# Patient Record
Sex: FEMALE | Race: WHITE | NOT HISPANIC OR LATINO | Employment: OTHER | URBAN - METROPOLITAN AREA
[De-identification: names, ages, dates, MRNs, and addresses within clinical notes are randomized per-mention and may not be internally consistent; named-entity substitution may affect disease eponyms.]

---

## 2017-07-08 ENCOUNTER — HOSPITAL ENCOUNTER (EMERGENCY)
Facility: HOSPITAL | Age: 27
Discharge: HOME | End: 2017-07-08
Attending: EMERGENCY MEDICINE

## 2017-07-08 VITALS
HEART RATE: 71 BPM | DIASTOLIC BLOOD PRESSURE: 65 MMHG | BODY MASS INDEX: 19.33 KG/M2 | HEIGHT: 70 IN | RESPIRATION RATE: 15 BRPM | OXYGEN SATURATION: 100 % | TEMPERATURE: 98.8 F | WEIGHT: 135 LBS | SYSTOLIC BLOOD PRESSURE: 99 MMHG

## 2017-07-08 DIAGNOSIS — N76.0 BACTERIAL VAGINOSIS: Primary | ICD-10-CM

## 2017-07-08 DIAGNOSIS — B96.89 BACTERIAL VAGINOSIS: Primary | ICD-10-CM

## 2017-07-08 LAB
BACTERIA UR QL AUTO: ABNORMAL /HPF
BILIRUB UR QL STRIP: NEGATIVE
CLARITY UR: CLEAR
COLOR UR: YELLOW
GLUCOSE UR STRIP-MCNC: NEGATIVE MG/DL
HGB UR QL STRIP.AUTO: ABNORMAL
KETONES UR STRIP-MCNC: NEGATIVE MG/DL
LEUKOCYTE ESTERASE UR QL STRIP: ABNORMAL
NITRITE UR QL STRIP: NEGATIVE
NON-SQ EPI CELLS URNS QL MICRO: ABNORMAL /HPF
PH UR STRIP.AUTO: 6.5 [PH] (ref 4.5–8)
PROT UR STRIP-MCNC: NEGATIVE MG/DL
RBC #/AREA URNS AUTO: ABNORMAL /HPF
SP GR UR STRIP.AUTO: <=1.005 (ref 1–1.03)
UROBILINOGEN UR QL STRIP.AUTO: 0.2 E.U./DL
WBC #/AREA URNS AUTO: ABNORMAL /HPF

## 2017-07-08 PROCEDURE — 87591 N.GONORRHOEAE DNA AMP PROB: CPT | Performed by: PHYSICIAN ASSISTANT

## 2017-07-08 PROCEDURE — 87491 CHLMYD TRACH DNA AMP PROBE: CPT | Performed by: PHYSICIAN ASSISTANT

## 2017-07-08 PROCEDURE — 81001 URINALYSIS AUTO W/SCOPE: CPT | Performed by: PHYSICIAN ASSISTANT

## 2017-07-08 PROCEDURE — 99283 EMERGENCY DEPT VISIT LOW MDM: CPT

## 2017-07-08 RX ORDER — METRONIDAZOLE 500 MG/1
500 TABLET ORAL EVERY 12 HOURS SCHEDULED
Qty: 14 TABLET | Refills: 0 | Status: SHIPPED | OUTPATIENT
Start: 2017-07-08 | End: 2017-07-15

## 2017-07-10 LAB
CHLAMYDIA DNA CVX QL NAA+PROBE: NORMAL
N GONORRHOEA DNA GENITAL QL NAA+PROBE: NORMAL

## 2018-05-29 ENCOUNTER — APPOINTMENT (EMERGENCY)
Dept: CT IMAGING | Facility: HOSPITAL | Age: 28
End: 2018-05-29

## 2018-05-29 ENCOUNTER — APPOINTMENT (EMERGENCY)
Dept: RADIOLOGY | Facility: HOSPITAL | Age: 28
End: 2018-05-29

## 2018-05-29 ENCOUNTER — HOSPITAL ENCOUNTER (EMERGENCY)
Facility: HOSPITAL | Age: 28
Discharge: HOME | End: 2018-05-29
Attending: EMERGENCY MEDICINE | Admitting: EMERGENCY MEDICINE

## 2018-05-29 VITALS
HEART RATE: 91 BPM | SYSTOLIC BLOOD PRESSURE: 102 MMHG | OXYGEN SATURATION: 98 % | RESPIRATION RATE: 16 BRPM | TEMPERATURE: 100.4 F | BODY MASS INDEX: 19.36 KG/M2 | WEIGHT: 134.92 LBS | DIASTOLIC BLOOD PRESSURE: 58 MMHG

## 2018-05-29 DIAGNOSIS — J32.9 SINUSITIS: ICD-10-CM

## 2018-05-29 DIAGNOSIS — R05.9 COUGH: ICD-10-CM

## 2018-05-29 DIAGNOSIS — R20.2 PARESTHESIA OF HAND: ICD-10-CM

## 2018-05-29 DIAGNOSIS — D64.9 ANEMIA: ICD-10-CM

## 2018-05-29 DIAGNOSIS — R51.9 HEADACHE: Primary | ICD-10-CM

## 2018-05-29 LAB
ALBUMIN SERPL BCP-MCNC: 3.9 G/DL (ref 3.5–5)
ALP SERPL-CCNC: 36 U/L (ref 46–116)
ALT SERPL W P-5'-P-CCNC: 22 U/L (ref 12–78)
ANION GAP SERPL CALCULATED.3IONS-SCNC: 9 MMOL/L (ref 4–13)
AST SERPL W P-5'-P-CCNC: 14 U/L (ref 5–45)
BACTERIA UR QL AUTO: ABNORMAL /HPF
BASOPHILS # BLD AUTO: 0.03 THOUSANDS/ΜL (ref 0–0.1)
BASOPHILS NFR BLD AUTO: 0 % (ref 0–1)
BILIRUB SERPL-MCNC: 0.4 MG/DL (ref 0.2–1)
BILIRUB UR QL STRIP: NEGATIVE
BUN SERPL-MCNC: 11 MG/DL (ref 5–25)
CALCIUM SERPL-MCNC: 8.6 MG/DL (ref 8.3–10.1)
CHLORIDE SERPL-SCNC: 101 MMOL/L (ref 100–108)
CLARITY UR: ABNORMAL
CO2 SERPL-SCNC: 23 MMOL/L (ref 21–32)
COLOR UR: YELLOW
CREAT SERPL-MCNC: 0.98 MG/DL (ref 0.6–1.3)
EOSINOPHIL # BLD AUTO: 0.3 THOUSAND/ΜL (ref 0–0.61)
EOSINOPHIL NFR BLD AUTO: 2 % (ref 0–6)
ERYTHROCYTE [DISTWIDTH] IN BLOOD BY AUTOMATED COUNT: 20.5 % (ref 11.6–15.1)
EXT PREGNANCY TEST URINE: NEGATIVE
GFR SERPL CREATININE-BSD FRML MDRD: 79 ML/MIN/1.73SQ M
GLUCOSE SERPL-MCNC: 97 MG/DL (ref 65–140)
GLUCOSE UR STRIP-MCNC: NEGATIVE MG/DL
HCG SERPL QL: NEGATIVE
HCT VFR BLD AUTO: 26 % (ref 34.8–46.1)
HGB BLD-MCNC: 7.2 G/DL (ref 11.5–15.4)
HGB UR QL STRIP.AUTO: ABNORMAL
IMM GRANULOCYTES # BLD AUTO: 0.07 THOUSAND/UL (ref 0–0.2)
IMM GRANULOCYTES NFR BLD AUTO: 1 % (ref 0–2)
KETONES UR STRIP-MCNC: NEGATIVE MG/DL
LEUKOCYTE ESTERASE UR QL STRIP: ABNORMAL
LYMPHOCYTES # BLD AUTO: 1.28 THOUSANDS/ΜL (ref 0.6–4.47)
LYMPHOCYTES NFR BLD AUTO: 10 % (ref 14–44)
MCH RBC QN AUTO: 16.6 PG (ref 26.8–34.3)
MCHC RBC AUTO-ENTMCNC: 27.7 G/DL (ref 31.4–37.4)
MCV RBC AUTO: 60 FL (ref 82–98)
MONOCYTES # BLD AUTO: 0.91 THOUSAND/ΜL (ref 0.17–1.22)
MONOCYTES NFR BLD AUTO: 7 % (ref 4–12)
NEUTROPHILS # BLD AUTO: 10.88 THOUSANDS/ΜL (ref 1.85–7.62)
NEUTS SEG NFR BLD AUTO: 80 % (ref 43–75)
NITRITE UR QL STRIP: NEGATIVE
NON-SQ EPI CELLS URNS QL MICRO: ABNORMAL /HPF
NRBC BLD AUTO-RTO: 0 /100 WBCS
OTHER STN SPEC: ABNORMAL
PH UR STRIP.AUTO: 7.5 [PH] (ref 4.5–8)
PLATELET # BLD AUTO: 350 THOUSANDS/UL (ref 149–390)
PMV BLD AUTO: 9.8 FL (ref 8.9–12.7)
POTASSIUM SERPL-SCNC: 3.3 MMOL/L (ref 3.5–5.3)
PROT SERPL-MCNC: 7.4 G/DL (ref 6.4–8.2)
PROT UR STRIP-MCNC: NEGATIVE MG/DL
RBC # BLD AUTO: 4.34 MILLION/UL (ref 3.81–5.12)
RBC #/AREA URNS AUTO: ABNORMAL /HPF
SODIUM SERPL-SCNC: 133 MMOL/L (ref 136–145)
SP GR UR STRIP.AUTO: 1.01 (ref 1–1.03)
UROBILINOGEN UR QL STRIP.AUTO: 0.2 E.U./DL
WBC # BLD AUTO: 13.47 THOUSAND/UL (ref 4.31–10.16)
WBC #/AREA URNS AUTO: ABNORMAL /HPF

## 2018-05-29 PROCEDURE — 70450 CT HEAD/BRAIN W/O DYE: CPT

## 2018-05-29 PROCEDURE — 85025 COMPLETE CBC W/AUTO DIFF WBC: CPT | Performed by: EMERGENCY MEDICINE

## 2018-05-29 PROCEDURE — 96361 HYDRATE IV INFUSION ADD-ON: CPT

## 2018-05-29 PROCEDURE — 36415 COLL VENOUS BLD VENIPUNCTURE: CPT | Performed by: EMERGENCY MEDICINE

## 2018-05-29 PROCEDURE — 96374 THER/PROPH/DIAG INJ IV PUSH: CPT

## 2018-05-29 PROCEDURE — 80053 COMPREHEN METABOLIC PANEL: CPT | Performed by: EMERGENCY MEDICINE

## 2018-05-29 PROCEDURE — 96375 TX/PRO/DX INJ NEW DRUG ADDON: CPT

## 2018-05-29 PROCEDURE — 84703 CHORIONIC GONADOTROPIN ASSAY: CPT | Performed by: EMERGENCY MEDICINE

## 2018-05-29 PROCEDURE — 81001 URINALYSIS AUTO W/SCOPE: CPT | Performed by: EMERGENCY MEDICINE

## 2018-05-29 PROCEDURE — 71046 X-RAY EXAM CHEST 2 VIEWS: CPT

## 2018-05-29 PROCEDURE — 99284 EMERGENCY DEPT VISIT MOD MDM: CPT

## 2018-05-29 RX ORDER — DOXYCYCLINE HYCLATE 100 MG/1
100 CAPSULE ORAL ONCE
Status: COMPLETED | OUTPATIENT
Start: 2018-05-29 | End: 2018-05-29

## 2018-05-29 RX ORDER — DOXYCYCLINE HYCLATE 100 MG/1
100 CAPSULE ORAL EVERY 12 HOURS SCHEDULED
Qty: 14 CAPSULE | Refills: 0 | Status: SHIPPED | OUTPATIENT
Start: 2018-05-29 | End: 2018-06-05

## 2018-05-29 RX ORDER — KETOROLAC TROMETHAMINE 30 MG/ML
15 INJECTION, SOLUTION INTRAMUSCULAR; INTRAVENOUS ONCE
Status: DISCONTINUED | OUTPATIENT
Start: 2018-05-29 | End: 2018-05-29

## 2018-05-29 RX ORDER — DIPHENHYDRAMINE HYDROCHLORIDE 50 MG/ML
25 INJECTION INTRAMUSCULAR; INTRAVENOUS ONCE
Status: COMPLETED | OUTPATIENT
Start: 2018-05-29 | End: 2018-05-29

## 2018-05-29 RX ORDER — METOCLOPRAMIDE HYDROCHLORIDE 5 MG/ML
10 INJECTION INTRAMUSCULAR; INTRAVENOUS ONCE
Status: COMPLETED | OUTPATIENT
Start: 2018-05-29 | End: 2018-05-29

## 2018-05-29 RX ADMIN — METOCLOPRAMIDE 10 MG: 5 INJECTION, SOLUTION INTRAMUSCULAR; INTRAVENOUS at 06:49

## 2018-05-29 RX ADMIN — DOXYCYCLINE 100 MG: 100 CAPSULE ORAL at 06:56

## 2018-05-29 RX ADMIN — DIPHENHYDRAMINE HYDROCHLORIDE 25 MG: 50 INJECTION, SOLUTION INTRAMUSCULAR; INTRAVENOUS at 06:48

## 2018-05-29 RX ADMIN — SODIUM CHLORIDE 1000 ML: 0.9 INJECTION, SOLUTION INTRAVENOUS at 06:48

## 2018-05-29 NOTE — DISCHARGE INSTRUCTIONS
Sinusitis   WHAT YOU NEED TO KNOW:   Sinusitis is inflammation or infection of your sinuses  It is most often caused by a virus  Acute sinusitis may last up to 12 weeks  Chronic sinusitis lasts longer than 12 weeks  Recurrent sinusitis means you have 4 or more times in 1 year  DISCHARGE INSTRUCTIONS:   Return to the emergency department if:   · Your eye and eyelid are red, swollen, and painful  · You cannot open your eye  · You have vision changes, such as double vision  · Your eyeball bulges out or you cannot move your eye  · You are more sleepy than normal, or you notice changes in your ability to think, move, or talk  · You have a stiff neck, a fever, or a bad headache  · You have swelling of your forehead or scalp  Contact your healthcare provider if:   · Your symptoms do not improve after 3 days  · Your symptoms do not go away after 10 days  · You have nausea and are vomiting  · Your nose is bleeding  · You have questions or concerns about your condition or care  Medicines: Your symptoms may go away on their own  Your healthcare provider may recommend watchful waiting for up to 10 days before starting antibiotics  You may  need any of the following:  · Acetaminophen  decreases pain and fever  It is available without a doctor's order  Ask how much to take and how often to take it  Follow directions  Read the labels of all other medicines you are using to see if they also contain acetaminophen, or ask your doctor or pharmacist  Acetaminophen can cause liver damage if not taken correctly  Do not use more than 4 grams (4,000 milligrams) total of acetaminophen in one day  · NSAIDs , such as ibuprofen, help decrease swelling, pain, and fever  This medicine is available with or without a doctor's order  NSAIDs can cause stomach bleeding or kidney problems in certain people  If you take blood thinner medicine, always ask your healthcare provider if NSAIDs are safe for you  Always read the medicine label and follow directions  · Nasal steroid sprays  may help decrease inflammation in your nose and sinuses  · Decongestants  help reduce swelling and drain mucus in the nose and sinuses  They may help you breathe easier  · Antihistamines  help dry mucus in the nose and relieve sneezing  · Antibiotics  help treat or prevent a bacterial infection  · Take your medicine as directed  Contact your healthcare provider if you think your medicine is not helping or if you have side effects  Tell him or her if you are allergic to any medicine  Keep a list of the medicines, vitamins, and herbs you take  Include the amounts, and when and why you take them  Bring the list or the pill bottles to follow-up visits  Carry your medicine list with you in case of an emergency  Self-care:   · Rinse your sinuses  Use a sinus rinse device to rinse your nasal passages with a saline (salt water) solution or distilled water  Do not use tap water  This will help thin the mucus in your nose and rinse away pollen and dirt  It will also help reduce swelling so you can breathe normally  Ask your healthcare provider how often to do this  · Breathe in steam   Heat a bowl of water until you see steam  Lean over the bowl and make a tent over your head with a large towel  Breathe deeply for about 20 minutes  Be careful not to get too close to the steam or burn yourself  Do this 3 times a day  You can also breathe deeply when you take a hot shower  · Sleep with your head elevated  Place an extra pillow under your head before you go to sleep to help your sinuses drain  · Drink liquids as directed  Ask your healthcare provider how much liquid to drink each day and which liquids are best for you  Liquids will thin the mucus in your nose and help it drain  Avoid drinks that contain alcohol or caffeine  · Do not smoke, and avoid secondhand smoke    Nicotine and other chemicals in cigarettes and cigars can make your symptoms worse  Ask your healthcare provider for information if you currently smoke and need help to quit  E-cigarettes or smokeless tobacco still contain nicotine  Talk to your healthcare provider before you use these products  Prevent the spread of germs that cause sinusitis:  Wash your hands often with soap and water  Wash your hands after you use the bathroom, change a child's diaper, or sneeze  Wash your hands before you prepare or eat food  Follow up with your healthcare provider as directed: You may be referred to an ear, nose, and throat specialist  Write down your questions so you remember to ask them during your visits  © 2017 [address] Information is for End User's use only and may not be sold, redistributed or otherwise used for commercial purposes  All illustrations and images included in CareNotes® are the copyrighted property of [employer]  or Jesse Gudino  The above information is an  only  It is not intended as medical advice for individual conditions or treatments  Talk to your doctor, nurse or pharmacist before following any medical regimen to see if it is safe and effective for you  Anemia   WHAT YOU NEED TO KNOW:   Anemia is a low number of red blood cells or a low amount of hemoglobin in your red blood cells  Hemoglobin is a protein that helps carry oxygen throughout your body  Red blood cells use iron to create hemoglobin  Anemia may develop if your body does not have enough iron  It may also develop if your body does not make enough red blood cells or they die faster than your body can make them  DISCHARGE INSTRUCTIONS:   Call 911 or have someone call 911 for any of the following:   · You lose consciousness  · You have severe chest pain  Return to the emergency department if:   · You have dark or bloody bowel movements      Contact your healthcare provider if:   · Your symptoms are worse, even after treatment  · You have questions or concerns about your condition or care  Medicines:   · Iron or folic acid supplements  help increase your red blood cell and hemoglobin levels  · Vitamin B12 injections  may help boost your red blood cell level and decrease your symptoms  Ask your healthcare provider how to inject B12  · Take your medicine as directed  Contact your healthcare provider if you think your medicine is not helping or if you have side effects  Tell him of her if you are allergic to any medicine  Keep a list of the medicines, vitamins, and herbs you take  Include the amounts, and when and why you take them  Bring the list or the pill bottles to follow-up visits  Carry your medicine list with you in case of an emergency  Prevent anemia:  Eat healthy foods rich in iron and vitamin C  Nuts, meat, dark leafy green vegetables, and beans are high in iron and protein  Vitamin C helps your body absorb iron  Foods rich in vitamin C include oranges and other citrus fruits  Ask your healthcare provider for a list of other foods that are high in iron or vitamin C  Ask if you need to be on a special diet  Follow up with your healthcare provider as directed:  Write down your questions so you remember to ask them during your visits  © 2017 [address] [employer] Information is for End User's use only and may not be sold, redistributed or otherwise used for commercial purposes  All illustrations and images included in CareNotes® are the copyrighted property of [employer] M [employer]  or Jesse Gudino  The above information is an  only  It is not intended as medical advice for individual conditions or treatments  Talk to your doctor, nurse or pharmacist before following any medical regimen to see if it is safe and effective for you  Acute Headache   WHAT YOU NEED TO KNOW:   An acute headache is pain or discomfort that starts suddenly and gets worse quickly   You may have an acute headache only when you feel stress or eat certain foods  Other acute headache pain can happen every day, and sometimes several times a day  DISCHARGE INSTRUCTIONS:   Return to the emergency department if:   · You have severe pain  · You have numbness or weakness on one side of your face or body  · You have a headache that occurs after a blow to the head, a fall, or other trauma  · You have a headache, are forgetful or confused, or have trouble speaking  · You have a headache, stiff neck, and a fever  Contact your healthcare provider if:   · You have a constant headache and are vomiting  · You have a headache each day that does not get better, even after treatment  · You have changes in your headaches, or new symptoms that occur when you have a headache  · You have questions or concerns about your condition or care  Medicines: You may need any of the following:  · Prescription pain medicine  may be given  The medicine your healthcare provider recommends will depend on the kind of headaches you have  You will need to take prescription headache medicines as directed to prevent a problem called rebound headache  These headaches happen with regular use of pain relievers for headache disorders  · NSAIDs , such as ibuprofen, help decrease swelling, pain, and fever  This medicine is available with or without a doctor's order  NSAIDs can cause stomach bleeding or kidney problems in certain people  If you take blood thinner medicine, always ask your healthcare provider if NSAIDs are safe for you  Always read the medicine label and follow directions  · Acetaminophen  decreases pain and fever  It is available without a doctor's order  Ask how much to take and how often to take it  Follow directions  Read the labels of all other medicines you are using to see if they also contain acetaminophen, or ask your doctor or pharmacist  Acetaminophen can cause liver damage if not taken correctly   Do not use more than 3 grams (3,000 milligrams) total of acetaminophen in one day  · Antidepressants  may be given for some kinds of headaches  · Take your medicine as directed  Contact your healthcare provider if you think your medicine is not helping or if you have side effects  Tell him or her if you are allergic to any medicine  Keep a list of the medicines, vitamins, and herbs you take  Include the amounts, and when and why you take them  Bring the list or the pill bottles to follow-up visits  Carry your medicine list with you in case of an emergency  Manage your symptoms:   · Apply heat or ice  on the headache area  Use a heat or ice pack  For an ice pack, you can also put crushed ice in a plastic bag  Cover the pack or bag with a towel before you apply it to your skin  Ice and heat both help decrease pain, and heat also helps decrease muscle spasms  Apply heat for 20 to 30 minutes every 2 hours  Apply ice for 15 to 20 minutes every hour  Apply heat or ice for as long and for as many days as directed  You may alternate heat and ice  · Relax your muscles  Lie down in a comfortable position and close your eyes  Relax your muscles slowly  Start at your toes and work your way up your body  · Keep a record of your headaches  Write down when your headaches start and stop  Include your symptoms and what you were doing when the headache began  Record what you ate or drank for 24 hours before the headache started  Describe the pain and where it hurts  Keep track of what you did to treat your headache and if it worked  Prevent an acute headache:   · Avoid anything that triggers an acute headache  Examples include exposure to chemicals, going to high altitude, or not getting enough sleep  Create a regular sleep routine  Go to sleep at the same time and wake up at the same time each day  Do not use electronic devices before bedtime  These may trigger a headache or prevent you from sleeping well      · Do not smoke   Nicotine and other chemicals in cigarettes and cigars can trigger an acute headache or make it worse  Ask your healthcare provider for information if you currently smoke and need help to quit  E-cigarettes or smokeless tobacco still contain nicotine  Talk to your healthcare provider before you use these products  · Limit alcohol as directed  Alcohol can trigger an acute headache or make it worse  If you have cluster headaches, do not drink alcohol during an episode  For other types of headaches, ask your healthcare provider if it is safe for you to drink alcohol  Ask how much is safe for you to drink, and how often  · Exercise as directed  Exercise can reduce tension and help with headache pain  Aim for 30 minutes of physical activity on most days of the week  Your healthcare provider can help you create an exercise plan  · Eat a variety of healthy foods  Healthy foods include fruits, vegetables, low-fat dairy products, lean meats, fish, whole grains, and cooked beans  Your healthcare provider or dietitian can help you create meals plans if you need to avoid foods that trigger headaches  Follow up with your healthcare provider as directed:  Bring your headache record with you when you see your healthcare provider  Write down your questions so you remember to ask them during your visits  © 2017 [address]  Information is for End User's use only and may not be sold, redistributed or otherwise used for commercial purposes  All illustrations and images included in CareNotes® are the copyrighted property of [employer] , [employer]  or Jesse Gudino  The above information is an  only  It is not intended as medical advice for individual conditions or treatments  Talk to your doctor, nurse or pharmacist before following any medical regimen to see if it is safe and effective for you  Paresthesia   WHAT YOU NEED TO KNOW:   Paresthesia is numbness and tingling   It can happen in any part of your body, but usually occurs in your legs, feet, arms, or hands  There are a large number of conditions that can cause paresthesia  It affects the nerves that provide sensation and happens when there are changes in nerves or nerve pathways  These changes can be temporary, such as if you take certain medicines or you are not getting enough vitamin B  Paresthesia can become permanent when there is nerve damage  Conditions that may cause nerve damage include diabetes, carpel tunnel syndrome, stroke, and multiple sclerosis  The exact cause of your paresthesia may be unknown  DISCHARGE INSTRUCTIONS:   Medicines:  Ask for more information about medicines you may need to treat the condition causing your paresthesias  · NSAIDs  help decrease swelling and pain or fever  This medicine is available with or without a doctor's order  NSAIDs can cause stomach bleeding or kidney problems in certain people  If you take blood thinner medicine, always ask your healthcare provider if NSAIDs are safe for you  Always read the medicine label and follow directions  · Take your medicine as directed  Contact your healthcare provider if you think your medicine is not helping or if you have side effects  Tell him or her if you are allergic to any medicine  Keep a list of the medicines, vitamins, and herbs you take  Include the amounts, and when and why you take them  Bring the list or the pill bottles to follow-up visits  Carry your medicine list with you in case of an emergency  Follow up with your healthcare provider or neurologist as directed:  Write down your questions so you remember to ask them during your visits  Contact your healthcare provider or neurologist if:   · Your symptoms do not improve  · You have symptoms in more than one part of your body  · You have questions about your condition or care    Return to the emergency department if:   · You have any of the following signs of a stroke: ¨ Numbness or drooping on one side of your face     ¨ Weakness in an arm or leg    ¨ Confusion or difficulty speaking    ¨ Dizziness, a severe headache, or vision loss    · You are not able to control your urine or bowel movements  · You have severe pain along with numbness and tingling  · Your legs suddenly become cold  You have trouble moving your legs, and they ache  · You have increased weakness in a part of your body  · You have uncontrolled movements, or you have a seizure  © 2017 [address] Information is for End User's use only and may not be sold, redistributed or otherwise used for commercial purposes  All illustrations and images included in CareNotes® are the copyrighted property of [employer]  or [address]  The above information is an  only  It is not intended as medical advice for individual conditions or treatments  Talk to your doctor, nurse or pharmacist before following any medical regimen to see if it is safe and effective for you

## 2018-05-29 NOTE — ED PROVIDER NOTES
History  Chief Complaint   Patient presents with    Chills     pt came in for chills, numbness on hands and wrist, dizziness, states she goes from feeling very cold to very hot   Dizziness    Numbness     32 y o  female presents with 5 hours of bilateral frontotemporal headache without radiation  Described as severe pressure that came on quickly, worsened and continues in the ER  Patient states light worsens the pain and nothing improves the pain  Patient denies maximal intensity of the headache within minutes of onset  Patient denies exertional component to the headache  Tunnel vision and shaking, decreased sensation of the bilateral hands, wrists and jaw that resolved    ROS: patient affirms nausea without vomiting and denies seizure, weight loss, confusion, focal weakness, diaphoresis, visual changes other than photophobia and the tunnel vision that resolved, neck pain/stiffness, facial pain, speech difficulty, weakness, gait disturbance, vertigo, lightheadedness, jaw claudication, syncope  Patient denies any concerns for CO exposure, recent tick bites, cervical manipulation, or trauma  Patient denies any use of illicit drugs  Patient denies a history of similar headaches  Patient denies any history of connective tissue disorders  Patient denies any history or family history of SAH  Patient denies any history of immunocompromised state  Objective:   PHYSICAL EXAM:   Constitutional : Non-toxic  Well developed, well-nourished with no acute distress   Eyes: PERRL, EOMI  Normal fundoscopic exam with no signs of papilledema or retinal hemorrhage  No nystagmus with gaze fixation  HENT: Atraumatic  Pharynx moist and non-erythematous  No tenderness or swelling over the temporal arteries  Neck: no carotid bruit, no tenderness to palpitation   CV: Regular rate and rhythm, no murmur   Peripheral pulses intact   Respiratory: Lungs clear to auscultation bilaterally   Abdomen: Soft, non-tender, non-distended  Back: No vertebral tenderness   Skin: Normal color, warm and dry   Extremities: Non-tender, no pedal edema  Neuro: Alert and answers questions appropriately  Normal tandem gait, including toe walking and heel walking  Normal Romberg exam  No pronator drift  Normal finger to nose  Normal fine motor function with rapid finger movements  Normal hand tap  Cranial nerves II through XII grossly intact  Visual fields grossly intact  Upper and lower motor strength 5/5 and symmetric  Normal light touch sensory exam  Normal DTRs  Medical Decision Making   24-year-old presenting with headache representing a broad differential   Based on patient's history and physical, will obtain CT imaging of patient's head considering rapid onset of symptoms and vague neurologic findings  Patient is febrile in the emergency room so will obtain infectious evaluation  I have discussed lumbar puncture with the patient who is currently declining this evaluation  I have discussed symptomatic management with the patient who was agreeable and reassessment  Will continue to discussed the potential for lumbar puncture considering rapid onset of symptoms with fever  Patient's history of chronic anemia that she has not followed with recently  Patient notes her last known hemoglobin was 4 when she required rapid transfusion  She is not sure what it was after that she was receiving iron infusions for some time but subsequently was unable to follow up due to loss of insurance  Patient denies lightheadedness, dizziness, syncope, or other symptoms or concerns that would be reflective of anemia  I discussed the need for continued follow-up with this and will provide patient with information regarding this  Dizziness       None       Past Medical History:   Diagnosis Date    Anemia        History reviewed  No pertinent surgical history  History reviewed  No pertinent family history    I have reviewed and agree with the history as documented  Social History   Substance Use Topics    Smoking status: Current Every Day Smoker     Packs/day: 0 50     Types: Cigarettes    Smokeless tobacco: Never Used    Alcohol use No        Review of Systems   Neurological: Positive for dizziness  All other systems reviewed and are negative        Physical Exam  Physical Exam    Vital Signs  ED Triage Vitals   Temperature Pulse Respirations Blood Pressure SpO2   05/29/18 0202 05/29/18 0158 05/29/18 0158 05/29/18 0158 05/29/18 0158   100 4 °F (38 °C) 88 18 112/70 100 %      Temp Source Heart Rate Source Patient Position - Orthostatic VS BP Location FiO2 (%)   05/29/18 0202 05/29/18 0158 05/29/18 0158 05/29/18 0158 --   Oral Monitor Lying Right arm       Pain Score       05/29/18 0158       5           Vitals:    05/29/18 0158 05/29/18 0646 05/29/18 0752 05/29/18 0912   BP: 112/70 110/54 90/53 102/58   Pulse: 88 85 58 91   Patient Position - Orthostatic VS: Lying Lying Lying Lying       Visual Acuity      ED Medications  Medications   sodium chloride 0 9 % bolus 1,000 mL (0 mL Intravenous Stopped 5/29/18 0904)   metoclopramide (REGLAN) injection 10 mg (10 mg Intravenous Given 5/29/18 0649)   diphenhydrAMINE (BENADRYL) injection 25 mg (25 mg Intravenous Given 5/29/18 0648)   doxycycline hyclate (VIBRAMYCIN) capsule 100 mg (100 mg Oral Given 5/29/18 0656)       Diagnostic Studies  Results Reviewed     Procedure Component Value Units Date/Time    hCG, qualitative pregnancy [[id number]]  (Normal) Collected:  05/29/18 0358    Lab Status:  Final result Specimen:  Blood from Arm, Right Updated:  05/29/18 0550     Preg, Serum Negative    Urine Microscopic [[id number]]  (Abnormal) Collected:  05/29/18 0358    Lab Status:  Final result Specimen:  Urine from Urine, Clean Catch Updated:  05/29/18 0434     RBC, UA 2-4 (A) /hpf      WBC, UA 2-4 (A) /hpf      Epithelial Cells Occasional /hpf      Bacteria, UA Occasional /hpf      OTHER OBSERVATIONS Transitional Epithelial Cells    Comprehensive metabolic panel [[id number]]  (Abnormal) Collected:  05/29/18 0358    Lab Status:  Final result Specimen:  Blood from Arm, Right Updated:  05/29/18 0430     Sodium 133 (L) mmol/L      Potassium 3 3 (L) mmol/L      Chloride 101 mmol/L      CO2 23 mmol/L      Anion Gap 9 mmol/L      BUN 11 mg/dL      Creatinine 0 98 mg/dL      Glucose 97 mg/dL      Calcium 8 6 mg/dL      AST 14 U/L      ALT 22 U/L      Alkaline Phosphatase 36 (L) U/L      Total Protein 7 4 g/dL      Albumin 3 9 g/dL      Total Bilirubin 0 40 mg/dL      eGFR 79 ml/min/1 73sq m     Narrative:         National Kidney Disease Education Program recommendations are as follows:  GFR calculation is accurate only with a steady state creatinine  Chronic Kidney disease less than 60 ml/min/1 73 sq  meters  Kidney failure less than 15 ml/min/1 73 sq  meters  UA w Reflex to Microscopic w Reflex to Culture [[id number]]  (Abnormal) Collected:  05/29/18 0358    Lab Status:  Final result Specimen:  Urine from Urine, Clean Catch Updated:  05/29/18 0414     Color, UA Yellow     Clarity, UA Slightly Cloudy     Specific [place], UA 1 010     pH, UA 7 5     Leukocytes, UA Small (A)     Nitrite, UA Negative     Protein, UA Negative mg/dl      Glucose, UA Negative mg/dl      Ketones, UA Negative mg/dl      Urobilinogen, UA 0 2 E U /dl      Bilirubin, UA Negative     Blood, UA Small (A)    POCT pregnancy, urine [[id number]]  (Normal) Collected:  05/29/18 0410    Lab Status:   In process Specimen:  Urine Updated:  05/29/18 0412     EXT Preg Test, Ur Negative    CBC and differential [[id number]]  (Abnormal) Collected:  05/29/18 0358    Lab Status:  Final result Specimen:  Blood from Arm, Right Updated:  05/29/18 0405     WBC 13 47 (H) Thousand/uL      RBC 4 34 Million/uL      Hemoglobin 7 2 (L) g/dL      Hematocrit 26 0 (L) %      MCV 60 (L) fL      MCH 16 6 (L) pg      MCHC 27 7 (L) g/dL      RDW 20 5 (H) %      MPV 9 8 fL      Platelets 803 Thousands/uL      nRBC 0 /100 WBCs      Neutrophils Relative 80 (H) %      Immat GRANS % 1 %      Lymphocytes Relative 10 (L) %      Monocytes Relative 7 %      Eosinophils Relative 2 %      Basophils Relative 0 %      Neutrophils Absolute 10 88 (H) Thousands/µL      Immature Grans Absolute 0 07 Thousand/uL      Lymphocytes Absolute 1 28 Thousands/µL      Monocytes Absolute 0 91 Thousand/µL      Eosinophils Absolute 0 30 Thousand/µL      Basophils Absolute 0 03 Thousands/µL                  XR chest 2 views   ED Interpretation by Azra Arrington MD (05/29 8741)   No acute findings  Final Result by Morgan Barney MD (05/29 3961)      No acute cardiopulmonary disease  Workstation performed: [id number]         CT head without contrast   Final Result by Sigrid Jackson MD (05/29 6197)      No acute intracranial process  Moderate left ethmoid, maxillary, and sphenoid mucosal thickening no gas fluid levels  Workstation performed: [id number]                    Procedures  Procedures       Phone Contacts  ED Phone Contact    ED Course  ED Course as of May 31 0622   Tue May 29, 2018   2596 Chronic anemia  Hemoglobin: (!) 7 2   0654  Patient's CT demonstrates signs of sinusitis  No acute intracranial pathologies demonstrated on CT finding  Extensive discussion the patient about potential evaluations  Discussed risks and benefits of lumbar puncture, including ability to determine infection or bleeding considering timing of patient's presentation at the limit for CT evaluation of SAH  Patient appears clear and competent in making medical decisions  After this discussion, patient declines lumbar puncture  Patient is agreeable to returning to emergency room with any change or continued symptoms  Patient has normal neurologic examination with no risk factors for CVT  Patient does not appear to have typical presentation for intracranial hypertension      Discussed symptomatic treatment including doxycycline considering progression of symptoms  Discussed risks of this including photodermatitis  Extensively discussed return precautions  Discussed follow up including with PCP for chronic anemia  0828 Due to poor outpatient follow up, I offered the patient admission to the hospital for close monitoring and evaluation regarding the patient's   Anemia  Patient denies any sources of active bleeding  Likely chronic but I have no recent laboratory evaluation to compare against   Patient declines admission to the hospital, agrees to outpatient follow-up but does not have insurance  I have explained to her the potential difficulty with outpatient follow-up  Patient understands and continues to decline admission to the hospital   Patient appears clear and competent in making medical decision  Patient is amenable to staying for case management to attempt to set up follow-up appointments with PCP     Haider Campuzano Patient's headache significantly improved  Continued minimal paresthesia only in left hand  No visual symptoms  Otherwise continues to have normal neurologic examination  7922 Again offered patient admission and additional testing  Patient's symptoms continue to improve  She continues to decline admission or additional testing  Agrees to follow with PCP  Agrees to return with continued or worsening symptoms                                  MDM  CritCare Time    Disposition  Final diagnoses:   Headache   Paresthesia of hand   Cough   Anemia   Sinusitis     Time reflects when diagnosis was documented in both MDM as applicable and the Disposition within this note     Time User Action Codes Description Comment    5/29/2018  7:10 AM Remus Bihari Add [R51] Headache     5/29/2018  7:10 AM Remus Bihari Add [R20 2] Paresthesia of hand     5/29/2018  7:10 AM Remus Bihari Add [R05] Cough     5/29/2018  7:11 AM Remus Bihari Add [D64 9] Anemia     5/29/2018  7:44 AM Remus Bihari Add [J32 9] Sinusitis       ED Disposition     ED Disposition Condition Comment    Discharge  [facility] discharge to home/self care  Condition at discharge: Stable        Follow-up Information    None         Discharge Medication List as of 5/29/2018  7:45 AM      START taking these medications    Details   doxycycline hyclate (VIBRAMYCIN) 100 mg capsule Take 1 capsule (100 mg total) by mouth every 12 (twelve) hours for 7 days, Starting Tue 5/29/2018, Until Tue 6/5/2018, Print           No discharge procedures on file      ED Provider  Electronically Signed by           Ray Murphy MD  05/31/18 7166

## 2018-05-29 NOTE — SOCIAL WORK
Complex CM received consult as per ED Nurse pt does not wish to stay for monitoring and has low hemoglobin  Pt needs PCP follow up and has no insurance  CM attempted [facility] and  for appointment  CM met with pt and family at bedside  Pt already spoke with financial counselor and received MA Application  Pt reports she has not applied in the past  Pt understands she needs to complete and return  CM offered resources for [place] [id number], and [facility] in [place]  CM offered to schedule appointment and call  Pt's SO reports he will schedule with his PCP and pt was in agreement to plan  CM reviewed resources for clinic should SO's PCP not accept as pt does not have insurance  CM updated nurse and SLIM  Pt has no other needs at this time and will dc home with transportation from family

## 2018-07-08 ENCOUNTER — HOSPITAL ENCOUNTER (EMERGENCY)
Facility: HOSPITAL | Age: 28
Discharge: HOME | End: 2018-07-08
Attending: EMERGENCY MEDICINE

## 2018-07-08 VITALS
BODY MASS INDEX: 18.47 KG/M2 | RESPIRATION RATE: 20 BRPM | DIASTOLIC BLOOD PRESSURE: 61 MMHG | TEMPERATURE: 98.3 F | HEART RATE: 78 BPM | OXYGEN SATURATION: 99 % | WEIGHT: 129 LBS | HEIGHT: 70 IN | SYSTOLIC BLOOD PRESSURE: 110 MMHG

## 2018-07-08 DIAGNOSIS — H66.90 ACUTE OTITIS MEDIA: Primary | ICD-10-CM

## 2018-07-08 DIAGNOSIS — H61.20 CERUMEN IMPACTION: ICD-10-CM

## 2018-07-08 PROCEDURE — 99282 EMERGENCY DEPT VISIT SF MDM: CPT

## 2018-07-08 RX ORDER — SULFAMETHOXAZOLE AND TRIMETHOPRIM 800; 160 MG/1; MG/1
1 TABLET ORAL 2 TIMES DAILY
Qty: 14 TABLET | Refills: 0 | Status: SHIPPED | OUTPATIENT
Start: 2018-07-08 | End: 2018-07-15

## 2018-07-08 RX ORDER — SULFAMETHOXAZOLE AND TRIMETHOPRIM 800; 160 MG/1; MG/1
1 TABLET ORAL ONCE
Status: COMPLETED | OUTPATIENT
Start: 2018-07-08 | End: 2018-07-08

## 2018-07-08 RX ORDER — SULFAMETHOXAZOLE AND TRIMETHOPRIM 800; 160 MG/1; MG/1
1 TABLET ORAL ONCE
Status: DISCONTINUED | OUTPATIENT
Start: 2018-07-08 | End: 2018-07-08

## 2018-07-08 RX ADMIN — SULFAMETHOXAZOLE AND TRIMETHOPRIM 1 TABLET: 800; 160 TABLET ORAL at 14:25

## 2018-07-08 NOTE — ED PROVIDER NOTES
History  Chief Complaint   Patient presents with    Ear Problem     Pt states " I cant hear from the right ear"     44-year-old female patient here for evaluation of pain and pressure as well as muffled hearing from the right ear  This all began about 2-3 days ago  She notes it feels a constant pressure  Chills but no fever  Headaches but states I always have headaches and it is no different  Constant pressure nothing she does makes it better or worse  She can still hear but it is all muffled from the ear  Denies any nausea vomiting  No neck pain or stiffness  No pain or swelling mastoid areas  No trauma  No visual disturbances        History provided by:  Patient   used: No    Earache   Location:  Right  Behind ear:  No abnormality  Quality:  Pressure  Severity:  Moderate  Onset quality:  Gradual  Duration:  2 days  Timing:  Constant  Progression:  Waxing and waning  Chronicity:  New  Context: not direct blow, not elevation change, not foreign body in ear, not loud noise, not recent URI and not water in ear    Relieved by:  Nothing  Worsened by:  Nothing  Ineffective treatments:  None tried  Associated symptoms: headaches    Associated symptoms: no abdominal pain, no congestion, no cough, no diarrhea, no ear discharge, no fever, no hearing loss, no neck pain, no rash, no rhinorrhea, no sore throat, no tinnitus and no vomiting    Risk factors: no recent travel, no chronic ear infection and no prior ear surgery        None       Past Medical History:   Diagnosis Date    Anemia        History reviewed  No pertinent surgical history  History reviewed  No pertinent family history  I have reviewed and agree with the history as documented      Social History   Substance Use Topics    Smoking status: Current Every Day Smoker     Packs/day: 0 50     Types: Cigarettes    Smokeless tobacco: Never Used    Alcohol use No        Review of Systems   Constitutional: Negative for activity change, appetite change, chills, diaphoresis, fatigue, fever and unexpected weight change  HENT: Positive for ear pain  Negative for congestion, ear discharge, hearing loss, rhinorrhea, sinus pressure, sore throat, tinnitus and trouble swallowing  Eyes: Negative for photophobia and visual disturbance  Respiratory: Negative for apnea, cough, choking, chest tightness, shortness of breath, wheezing and stridor  Cardiovascular: Negative for chest pain, palpitations and leg swelling  Gastrointestinal: Negative for abdominal distention, abdominal pain, blood in stool, constipation, diarrhea, nausea and vomiting  Genitourinary: Negative for decreased urine volume, difficulty urinating, dysuria, enuresis, flank pain, frequency, hematuria and urgency  Musculoskeletal: Negative for arthralgias, myalgias, neck pain and neck stiffness  Skin: Negative for color change, pallor, rash and wound  Allergic/Immunologic: Negative  Neurological: Positive for headaches  Negative for dizziness, tremors, syncope, weakness, light-headedness and numbness  Hematological: Negative  Psychiatric/Behavioral: Negative  All other systems reviewed and are negative  Physical Exam  Physical Exam   Constitutional: She is oriented to person, place, and time  She appears well-developed and well-nourished  Non-toxic appearance  She does not have a sickly appearance  She does not appear ill  No distress  HENT:   Head: Normocephalic and atraumatic  Right Ear: External ear normal  No lacerations  No drainage, swelling or tenderness  No foreign bodies  No mastoid tenderness  Tympanic membrane is injected, scarred, erythematous and bulging  Tympanic membrane is not perforated and not retracted  No middle ear effusion  No hemotympanum  Decreased hearing is noted  Left Ear: Hearing, tympanic membrane, external ear and ear canal normal  No lacerations  No drainage, swelling or tenderness  No foreign bodies   No mastoid tenderness  Tympanic membrane is not injected, not scarred, not perforated, not erythematous, not retracted and not bulging  No middle ear effusion  No hemotympanum  No decreased hearing is noted  Cerumen impaction right ear  After cerumen disimpaction she has bulging erythematous TM  Eyes: EOM and lids are normal  Pupils are equal, round, and reactive to light  Neck: Normal range of motion  Neck supple  Cardiovascular: Normal rate, regular rhythm, S1 normal, S2 normal, normal heart sounds, intact distal pulses and normal pulses  Exam reveals no gallop, no distant heart sounds, no friction rub and no decreased pulses  No murmur heard  Pulses:       Radial pulses are 2+ on the right side, and 2+ on the left side  Pulmonary/Chest: Effort normal and breath sounds normal  No accessory muscle usage  No apnea, no tachypnea and no bradypnea  No respiratory distress  She has no decreased breath sounds  She has no wheezes  She has no rhonchi  She has no rales  Abdominal: Soft  Normal appearance and bowel sounds are normal  She exhibits no distension and no mass  There is no tenderness  There is no rigidity, no rebound and no guarding  No hernia  Musculoskeletal: Normal range of motion  She exhibits no edema, tenderness or deformity  Neurological: She is alert and oriented to person, place, and time  No cranial nerve deficit  GCS eye subscore is 4  GCS verbal subscore is 5  GCS motor subscore is 6  GCS 15  AAOx3  Ambulating in department without difficulty  CN II-XII grossly intact  No focal neuro deficits  Skin: Skin is warm, dry and intact  No rash noted  She is not diaphoretic  No erythema  No pallor  Psychiatric: Her speech is normal    Nursing note and vitals reviewed        Vital Signs  ED Triage Vitals [07/08/18 1321]   Temperature Pulse Respirations Blood Pressure SpO2   98 3 °F (36 8 °C) 78 20 110/61 99 %      Temp Source Heart Rate Source Patient Position - Orthostatic VS BP Location FiO2 (%)   Oral Monitor Sitting Left arm --      Pain Score       No Pain           Vitals:    07/08/18 1321   BP: 110/61   Pulse: 78   Patient Position - Orthostatic VS: Sitting       Visual Acuity      ED Medications  Medications   sulfamethoxazole-trimethoprim (BACTRIM DS) 800-160 mg per tablet 1 tablet (1 tablet Oral Given 7/8/18 1425)       Diagnostic Studies  Results Reviewed     None                 No orders to display              Procedures  Cerumen Removal  Date/Time: 7/8/2018 3:09 PM  Performed by: Lyn Sanchez by: Keyona Bee     Patient location:  ED  Indications / Diagnosis:  Cerumen impaction  Other Assisting Provider: Yes (comment)    Consent:     Consent obtained:  Verbal    Consent given by:  Patient    Risks discussed:  Bleeding, dizziness, incomplete removal, infection, pain and TM perforation    Alternatives discussed:  Referral  Universal protocol:     Patient identity confirmed:  Verbally with patient, arm band and hospital-assigned identification number  Procedure details:     Local anesthetic:  None    Location:  R ear    Procedure type: irrigation      Approach:  External    Visualization (free text):  TM visualized afterwards, no TM rupture  bulging and erythematous    Equipment used:  14g catheter tip and 20cc syringe with sterile water  Post-procedure details:     Complication:  None    Hearing quality:  Normal    Patient tolerance of procedure:   Tolerated well, no immediate complications           Phone Contacts  ED Phone Contact    ED Course  ED Course as of Jul 08 1510   Rohithhelenaana luisa Ayala Jul 08, 2018   1506 After cerumen impaction patient has return of normal hearing                                MDM  Number of Diagnoses or Management Options  Acute otitis media: new and requires workup  Cerumen impaction: new and requires workup  Diagnosis management comments: DDx including but not limited to: Otitis media, otitis externa, bullous myringitis, perforated TM, impacted cerumen, cellulitis  Plan:  History exam consistent with otitis media  She has multiple antibiotic allergies  She is unsure if she has ever tried Bactrim in the past including penicillins and azithromycin  Will give her dose here and briefly observed for any allergic reaction    Risk of Complications, Morbidity, and/or Mortality  Presenting problems: low  Management options: low  General comments: 28-year-old female with earache  After cerumen disimpaction she has improved hearing, states nearly normal   She does have erythematous bulging TM  Will treat for otitis media  Consistent with otitis media  Given Bactrim  Tolerated without any allergic reaction  Will discharge home with a continued prescription  Recommended she follow up with ENT giving her muffled hearing although this is likely related to her current acute otitis media  Return parameters provided  Pt understands and agrees with plan  Patient Progress  Patient progress: stable    CritCare Time    Disposition  Final diagnoses:   Acute otitis media   Cerumen impaction     Time reflects when diagnosis was documented in both MDM as applicable and the Disposition within this note     Time User Action Codes Description Comment    7/8/2018  3:06 PM Jon Suarez Add [H66 90] Acute otitis media     7/8/2018  3:06 PM Jon Suarez Add [H61 20] Cerumen impaction       ED Disposition     ED Disposition Condition Comment    Discharge  [facility] discharge to home/self care      Condition at discharge: Good        Follow-up Information     Follow up With Specialties Details Why [address] [facility] [address] [facility]  Call As needed, If symptoms worsen [address], [facility] [address] [address]  [phone]          Patient's Medications   Discharge Prescriptions    SULFAMETHOXAZOLE-TRIMETHOPRIM (BACTRIM DS) 800-160 MG PER TABLET    Take 1 tablet by mouth 2 (two) times a day for 7 days       Start Date: 7/8/2018  End Date: 7/15/2018       Order Dose: 1 tablet       Quantity: 14 tablet    Refills: 0     No discharge procedures on file      ED Provider  Electronically Signed by           Dorina Villarreal PA-C  07/08/18 Larry Zapata PA-C  07/08/18 2878

## 2018-07-08 NOTE — DISCHARGE INSTRUCTIONS
Return sooner to the Emergency Department if persistent fever, vomiting, diarrhea, difficulty breathing or urinating, neck stiffness, lethargy, rash  Cerumen Impaction   WHAT YOU NEED TO KNOW:   Cerumen impaction is the blockage of the outer ear canal by tightly packed cerumen (earwax)  It is generally treated with procedures such as flushing or suctioning the ear canal or the use of instruments to remove the impaction  DISCHARGE INSTRUCTIONS:   Medicines:  · Ear drops: These are used to soften the wax in your ear  Wax softening ear drops may be bought without a prescription  Ask your healthcare provider how often you should use this medicine  Read the instructions carefully before you use the ear drops  Do the following when you put in ear drops:     ¨ Warm the drops by holding the bottle in your hands for a few minutes  Cold ear drops may make you dizzy  ¨ Lie down with the affected ear toward the ceiling  You may also stand with your head tilted to one side  ¨ Pull your ear lobe up and back, and place the correct number of drops into the ear  ¨ Keep your ear facing up for 5 to 10 minutes so the drops coat the outer ear canal      ¨ Gently clean the outer part of the ear with a cotton swab  Do not  place the cotton swab or anything inside your ear canal  This increases the risk of damaging your eardrum  · Take your medicine as directed  Contact your healthcare provider if you think your medicine is not helping or if you have side effects  Tell him of her if you are allergic to any medicine  Keep a list of the medicines, vitamins, and herbs you take  Include the amounts, and when and why you take them  Bring the list or the pill bottles to follow-up visits  Carry your medicine list with you in case of an emergency  Follow up with your healthcare provider as directed:  Write down your questions so you remember to ask them during your visits     Contact your healthcare provider if:   · You have a fever  · You have trouble hearing or ringing in your ear  · You have questions about your condition or care  Return to the emergency department if:   · You feel dizzy  · You have discharge or blood coming out of your ear  · Your ear pain does not go away or gets worse  © 2017 [address] [employer] [employer] Information is for End User's use only and may not be sold, redistributed or otherwise used for commercial purposes  All illustrations and images included in CareNotes® are the copyrighted property of [employer]  or Jesse Gudino  The above information is an  only  It is not intended as medical advice for individual conditions or treatments  Talk to your doctor, nurse or pharmacist before following any medical regimen to see if it is safe and effective for you  Otitis Media   AMBULATORY CARE:   Otitis media  is an ear infection  Common symptoms include the following:   · Fever or a headache    · Ear pain    · Trouble hearing    · Ear feels plugged or full or you have ringing or buzzing in your ear    · Dizziness or you lose your balance    · Nausea or vomiting  Seek immediate care for the following symptoms:   · Seizure    · Fever and a stiff neck  Treatment for otitis media  may include any of the following:  · NSAIDs , such as ibuprofen, help decrease swelling, pain, and fever  This medicine is available with or without a doctor's order  NSAIDs can cause stomach bleeding or kidney problems in certain people  If you take blood thinner medicine, always ask your healthcare provider if NSAIDs are safe for you  Always read the medicine label and follow directions  · Ear drops  to help treat your ear pain  · Antibiotics  to help kill the germs that caused your ear infection  Care for otitis media:   · Use heat  Place a warm, moist washcloth on your ear to decrease pain  Apply for 15 to 20 minutes, 3 to 4 times a day    · Use ice    Ice helps decrease swelling and pain  Use an ice pack or put crushed ice in a plastic bag  Cover the ice pack with a towel and place it on your ear for 15 to 20 minutes, 3 to 4 times a day for 2 days  Prevent otitis media:   · Wash your hands often  This will help prevent the spread of germs  Encourage everyone in your house to wash their hands with soap and water after they use the bathroom  Everyone should also wash their hands after they change a child's diaper and before they prepare or eat food  · Stay away from people who are ill  Germs are easily and quickly spread through contact  Follow up with your healthcare provider as directed:  Write down your questions so you remember to ask them during your visits  © 2017 [address] [facility] Information is for End User's use only and may not be sold, redistributed or otherwise used for commercial purposes  All illustrations and images included in CareNotes® are the copyrighted property of [employer]  or Jesse Gudino  The above information is an  only  It is not intended as medical advice for individual conditions or treatments  Talk to your doctor, nurse or pharmacist before following any medical regimen to see if it is safe and effective for you

## 2019-02-04 ENCOUNTER — HOSPITAL ENCOUNTER (EMERGENCY)
Facility: HOSPITAL | Age: 29
Discharge: HOME | End: 2019-02-04
Attending: EMERGENCY MEDICINE | Admitting: EMERGENCY MEDICINE
Payer: COMMERCIAL

## 2019-02-04 ENCOUNTER — APPOINTMENT (EMERGENCY)
Dept: RADIOLOGY | Facility: HOSPITAL | Age: 29
End: 2019-02-04
Payer: COMMERCIAL

## 2019-02-04 VITALS
HEART RATE: 76 BPM | DIASTOLIC BLOOD PRESSURE: 52 MMHG | WEIGHT: 143.3 LBS | SYSTOLIC BLOOD PRESSURE: 111 MMHG | RESPIRATION RATE: 14 BRPM | OXYGEN SATURATION: 98 % | TEMPERATURE: 97.9 F | BODY MASS INDEX: 20.56 KG/M2

## 2019-02-04 DIAGNOSIS — R06.02 SHORTNESS OF BREATH: Primary | ICD-10-CM

## 2019-02-04 LAB
ANION GAP SERPL CALCULATED.3IONS-SCNC: 9 MMOL/L (ref 4–13)
ATRIAL RATE: 75 BPM
BASOPHILS # BLD AUTO: 0.05 THOUSANDS/ΜL (ref 0–0.1)
BASOPHILS NFR BLD AUTO: 1 % (ref 0–1)
BUN SERPL-MCNC: 16 MG/DL (ref 5–25)
CALCIUM SERPL-MCNC: 8.8 MG/DL (ref 8.3–10.1)
CHLORIDE SERPL-SCNC: 103 MMOL/L (ref 100–108)
CO2 SERPL-SCNC: 27 MMOL/L (ref 21–32)
CREAT SERPL-MCNC: 0.98 MG/DL (ref 0.6–1.3)
DEPRECATED D DIMER PPP: 350 NG/ML (FEU)
EOSINOPHIL # BLD AUTO: 0.54 THOUSAND/ΜL (ref 0–0.61)
EOSINOPHIL NFR BLD AUTO: 7 % (ref 0–6)
ERYTHROCYTE [DISTWIDTH] IN BLOOD BY AUTOMATED COUNT: 14 % (ref 11.6–15.1)
EXT PREG TEST URINE: NEGATIVE
GFR SERPL CREATININE-BSD FRML MDRD: 79 ML/MIN/1.73SQ M
GLUCOSE SERPL-MCNC: 93 MG/DL (ref 65–140)
HCT VFR BLD AUTO: 39.1 % (ref 34.8–46.1)
HGB BLD-MCNC: 12.6 G/DL (ref 11.5–15.4)
IMM GRANULOCYTES # BLD AUTO: 0.03 THOUSAND/UL (ref 0–0.2)
IMM GRANULOCYTES NFR BLD AUTO: 0 % (ref 0–2)
LYMPHOCYTES # BLD AUTO: 1.89 THOUSANDS/ΜL (ref 0.6–4.47)
LYMPHOCYTES NFR BLD AUTO: 24 % (ref 14–44)
MCH RBC QN AUTO: 27.5 PG (ref 26.8–34.3)
MCHC RBC AUTO-ENTMCNC: 32.2 G/DL (ref 31.4–37.4)
MCV RBC AUTO: 85 FL (ref 82–98)
MONOCYTES # BLD AUTO: 0.54 THOUSAND/ΜL (ref 0.17–1.22)
MONOCYTES NFR BLD AUTO: 7 % (ref 4–12)
NEUTROPHILS # BLD AUTO: 4.98 THOUSANDS/ΜL (ref 1.85–7.62)
NEUTS SEG NFR BLD AUTO: 61 % (ref 43–75)
NRBC BLD AUTO-RTO: 0 /100 WBCS
P AXIS: 76 DEGREES
PLATELET # BLD AUTO: 251 THOUSANDS/UL (ref 149–390)
PMV BLD AUTO: 10.1 FL (ref 8.9–12.7)
POTASSIUM SERPL-SCNC: 3.8 MMOL/L (ref 3.5–5.3)
PR INTERVAL: 176 MS
QRS AXIS: 89 DEGREES
QRSD INTERVAL: 82 MS
QT INTERVAL: 384 MS
QTC INTERVAL: 428 MS
RBC # BLD AUTO: 4.59 MILLION/UL (ref 3.81–5.12)
SODIUM SERPL-SCNC: 139 MMOL/L (ref 136–145)
T WAVE AXIS: 28 DEGREES
VENTRICULAR RATE: 75 BPM
WBC # BLD AUTO: 8.03 THOUSAND/UL (ref 4.31–10.16)

## 2019-02-04 PROCEDURE — 93010 ELECTROCARDIOGRAM REPORT: CPT | Performed by: INTERNAL MEDICINE

## 2019-02-04 PROCEDURE — 36415 COLL VENOUS BLD VENIPUNCTURE: CPT | Performed by: EMERGENCY MEDICINE

## 2019-02-04 PROCEDURE — 93005 ELECTROCARDIOGRAM TRACING: CPT

## 2019-02-04 PROCEDURE — 71046 X-RAY EXAM CHEST 2 VIEWS: CPT

## 2019-02-04 PROCEDURE — 85379 FIBRIN DEGRADATION QUANT: CPT | Performed by: EMERGENCY MEDICINE

## 2019-02-04 PROCEDURE — 80048 BASIC METABOLIC PNL TOTAL CA: CPT | Performed by: EMERGENCY MEDICINE

## 2019-02-04 PROCEDURE — 99285 EMERGENCY DEPT VISIT HI MDM: CPT

## 2019-02-04 PROCEDURE — 81025 URINE PREGNANCY TEST: CPT | Performed by: EMERGENCY MEDICINE

## 2019-02-04 PROCEDURE — 85025 COMPLETE CBC W/AUTO DIFF WBC: CPT | Performed by: EMERGENCY MEDICINE

## 2019-02-04 RX ORDER — ALBUTEROL SULFATE 2.5 MG/3ML
2.5 SOLUTION RESPIRATORY (INHALATION) EVERY 6 HOURS PRN
Qty: 75 ML | Refills: 0 | Status: SHIPPED | OUTPATIENT
Start: 2019-02-04 | End: 2019-03-06

## 2019-02-04 RX ORDER — ALBUTEROL SULFATE 90 UG/1
2 AEROSOL, METERED RESPIRATORY (INHALATION) EVERY 4 HOURS PRN
Qty: 1 INHALER | Refills: 1 | Status: SHIPPED | OUTPATIENT
Start: 2019-02-04 | End: 2019-03-06

## 2019-02-04 NOTE — DISCHARGE INSTRUCTIONS
Call [employer] at  [phone] to obtain a primary care physician  They will be able to schedule you with a physician who sees patients with your insurance and physicians who see patients without insurance

## 2019-02-04 NOTE — ED PROVIDER NOTES
History  Chief Complaint   Patient presents with    Shortness of Breath     pt c/o feeling SOB for the past few months  HPI     63-year-old female with history of anemia of unknown cause who presents for evaluation of shortness of breath  Patient states she was seen in the emergency department here in May and for shortness of breath, was told that part of my lung was collapsed    States she was discharged home with an inhaler, and since that time has had shortness of breath requiring multiple breathing treatments a day  I reviewed the patient's medical record from that visit, her chest x-ray was read as normal so I am unsure what she is referring to  She tells me that she has not been seen anywhere else in the interim  States that she gets albuterol from a friend, takes 2 nebulizer treatments daily when she feels like her chest is getting tight and like she can't breathe  States she develops audible wheezing  This usually is worse at night, states that she sometimes wakes up in the middle the night with wheezing and needs a breathing treatment in order to get through the night  Typically uses her inhaler 2 to 3 times a day  She does not have a primary care doctor that she follows with secondary to lack of insurance  Endorses some occasional chest tightness with the wheezing, but denies chest pain or tightness in the absence of wheezing  Reports shortness of breath only when he is wheezing  Endorses lightheadedness when she feels short of breath  No recent nausea, vomiting, abdominal pain, sore throat, or body aches  No aggravating or alleviating factors or associated symptoms  None       Past Medical History:   Diagnosis Date    Anemia        History reviewed  No pertinent surgical history  History reviewed  No pertinent family history  I have reviewed and agree with the history as documented      Social History   Substance Use Topics    Smoking status: Current Every Day Smoker Packs/day: 0 50     Types: Cigarettes    Smokeless tobacco: Never Used    Alcohol use Yes      Comment: socially        Review of Systems   Constitutional: Negative for chills and fever  HENT: Negative for congestion  Eyes: Negative for visual disturbance  Respiratory: Positive for chest tightness (not present currently), shortness of breath (not present currently) and wheezing (not present currently)  Negative for cough  Cardiovascular: Negative for chest pain, palpitations and leg swelling  Gastrointestinal: Negative for abdominal pain, diarrhea, nausea and vomiting  Genitourinary: Negative for dysuria and frequency  Musculoskeletal: Negative for arthralgias, back pain, neck pain and neck stiffness  Skin: Negative for rash  Neurological: Positive for light-headedness (not present currently)  Negative for syncope, weakness, numbness and headaches  Psychiatric/Behavioral: Negative for agitation, behavioral problems and confusion  Physical Exam  Physical Exam   Constitutional: She is oriented to person, place, and time  She appears well-developed and well-nourished  No distress  HENT:   Head: Normocephalic and atraumatic  Right Ear: External ear normal    Left Ear: External ear normal    Nose: Nose normal    Mouth/Throat: Oropharynx is clear and moist    Eyes: Conjunctivae are normal    Neck: Normal range of motion  Neck supple  Cardiovascular: Normal rate, regular rhythm, normal heart sounds and intact distal pulses  Exam reveals no gallop and no friction rub  No murmur heard  Pulmonary/Chest: Effort normal and breath sounds normal  No respiratory distress  She has no wheezes  She has no rales  Abdominal: Soft  Bowel sounds are normal  She exhibits no distension  There is no tenderness  There is no guarding  Musculoskeletal: Normal range of motion  She exhibits no edema or deformity  Neurological: She is alert and oriented to person, place, and time   She exhibits normal muscle tone  Skin: Skin is warm and dry  She is not diaphoretic  Vital Signs  ED Triage Vitals [02/04/19 1154]   Temperature Pulse Respirations Blood Pressure SpO2   97 9 °F (36 6 °C) 94 15 119/58 98 %      Temp Source Heart Rate Source Patient Position - Orthostatic VS BP Location FiO2 (%)   Oral Monitor Sitting Right arm --      Pain Score       No Pain           Vitals:    02/04/19 1154 02/04/19 1321   BP: 119/58 111/52   Pulse: 94 76   Patient Position - Orthostatic VS: Sitting Lying       Visual Acuity      ED Medications  Medications - No data to display    Diagnostic Studies  Results Reviewed     Procedure Component Value Units Date/Time    D-dimer, quantitative [[id number]]  (Normal) Collected:  02/04/19 1227    Lab Status:  Final result Specimen:  Blood from Arm, Left Updated:  02/04/19 1255     D-Dimer, Quant 350 ng/ml (FEU)     Basic metabolic panel [[id number]] Collected:  02/04/19 1227    Lab Status:  Final result Specimen:  Blood from Arm, Left Updated:  02/04/19 1254     Sodium 139 mmol/L      Potassium 3 8 mmol/L      Chloride 103 mmol/L      CO2 27 mmol/L      ANION GAP 9 mmol/L      BUN 16 mg/dL      Creatinine 0 98 mg/dL      Glucose 93 mg/dL      Calcium 8 8 mg/dL      eGFR 79 ml/min/1 73sq m     Narrative:         National Kidney Disease Education Program recommendations are as follows:  GFR calculation is accurate only with a steady state creatinine  Chronic Kidney disease less than 60 ml/min/1 73 sq  meters  Kidney failure less than 15 ml/min/1 73 sq  meters      POCT pregnancy, urine [[id number]]  (Normal) Resulted:  02/04/19 1245    Lab Status:  Final result Updated:  02/04/19 1245     EXT PREG TEST UR (Ref: Negative) negative    CBC and differential [[id number]]  (Abnormal) Collected:  02/04/19 1227    Lab Status:  Final result Specimen:  Blood from Arm, Left Updated:  02/04/19 1235     WBC 8 03 Thousand/uL      RBC 4 59 Million/uL      Hemoglobin 12 6 g/dL      Hematocrit 39 1 %      MCV 85 fL      MCH 27 5 pg      MCHC 32 2 g/dL      RDW 14 0 %      MPV 10 1 fL      Platelets 353 Thousands/uL      nRBC 0 /100 WBCs      Neutrophils Relative 61 %      Immat GRANS % 0 %      Lymphocytes Relative 24 %      Monocytes Relative 7 %      Eosinophils Relative 7 (H) %      Basophils Relative 1 %      Neutrophils Absolute 4 98 Thousands/µL      Immature Grans Absolute 0 03 Thousand/uL      Lymphocytes Absolute 1 89 Thousands/µL      Monocytes Absolute 0 54 Thousand/µL      Eosinophils Absolute 0 54 Thousand/µL      Basophils Absolute 0 05 Thousands/µL                  XR chest 2 views   Final Result by Mello Carl DO (02/04 1423)      No acute cardiopulmonary disease  Workstation performed: [id number]                    Procedures  Procedures       Phone Contacts  ED Phone Contact    ED Course                               MDM  Number of Diagnoses or Management Options  Shortness of breath:   Diagnosis management comments: Generally well appearing  Afebrile and hemodynamically stable  In no apparent distress  Satting well on room air  Not tachycardic  D-dimer obtained that is not elevated making PE unlikely  Chest x-ray with no focal consolidation to suggest pneumonia  Patient reports wheezing prior to arrival, but states this resolved with a DuoNeb that she used prior to coming in  No indication for bronchodilators at this time, but it sounds like the patient may have undiagnosed asthma  She does not have a primary care doctor secondary to lack of insurance  Will give information for [employer] for her to call to make an appointment with a provider that will see her  I personally interpreted the patient's EKG, which reveals normal rate, normal sinus rhythm, normal axis, normal intervals, no ischemic changes  CBC with no leukocytosis, normal hemoglobin, patient is not anemic here today  Doubt this is infectious      Will provide prescriptions for albuterol, though counseled the patient that she will need follow up with a primary care doctor for continued care  Recommend pulmonary function testing and smoking cessation  Patient is asymptomatic here currently  Discharged in good condition  Amount and/or Complexity of Data Reviewed  Clinical lab tests: ordered and reviewed  Tests in the radiology section of CPT®: ordered and reviewed  Review and summarize past medical records: yes  Independent visualization of images, tracings, or specimens: yes    Patient Progress  Patient progress: stable       Generally well appearing  Afebrile and hemodynamically stable  In no apparent distress  Satting well on room air  Not tachycardic  D-dimer obtained that is not elevated making PE unlikely  Chest x-ray with no focal consolidation to suggest pneumonia  Patient reports wheezing prior to arrival, but states this resolved with a DuoNeb that she used prior to coming in  No indication for bronchodilators at this time, but it sounds like the patient may have undiagnosed asthma  She does not have a primary care doctor secondary to lack of insurance  Will give information for [facility] for her to call to make an appointment with a provider that will see her  I personally interpreted the patient's EKG, which reveals normal rate, normal sinus rhythm, normal axis, normal intervals, no ischemic changes  CBC with no leukocytosis, normal hemoglobin, patient is not anemic here today  Doubt this is infectious  Will provide prescriptions for albuterol, though counseled the patient that she will need follow up with a primary care doctor for continued care  Recommend pulmonary function testing and smoking cessation  Patient is asymptomatic here currently  Discharged in good condition      Disposition  Final diagnoses:   Shortness of breath     Time reflects when diagnosis was documented in both MDM as applicable and the Disposition within this note     Time User Action Codes Description Comment 2/4/2019  1:17 PM Jake Ocampo Add [R06 02] Shortness of breath       ED Disposition     ED Disposition Condition Date/Time Comment    Discharge  Mon Feb 4, 2019  1:17 PM Félix Guzman discharge to home/self care  Condition at discharge: Good        Follow-up Information     Follow up With Specialties Details Why Contact Info Additional [address] [facility] Emergency Department Emergency Medicine  For chest pain, worsening shortness of breath, or dizziness  [address] [facility] [address]  [phone] [place] [facility], [address] [place] [address] Pulmonology   [facility] [address] [facility], [address], [place], [address]          Discharge Medication List as of 2/4/2019  1:30 PM      START taking these medications    Details   albuterol (2 5 mg/3 mL) 0 083 % nebulizer solution Take 1 vial (2 5 mg total) by nebulization every 6 (six) hours as needed for wheezing or shortness of breath for up to 30 days, Starting Mon 2/4/2019, Until Wed 3/6/2019, Print      albuterol (PROVENTIL HFA,VENTOLIN HFA) 90 mcg/act inhaler Inhale 2 puffs every 4 (four) hours as needed for wheezing for up to 30 days, Starting Mon 2/4/2019, Until Wed 3/6/2019, Print           No discharge procedures on file      ED Provider  Electronically Signed by           Edita Lim MD  02/04/19 0352

## 2019-07-18 ENCOUNTER — TRANSCRIBE ORDERS (OUTPATIENT)
Dept: ADMINISTRATIVE | Facility: HOSPITAL | Age: 29
End: 2019-07-18

## 2019-07-18 DIAGNOSIS — J45.901 ASTHMATIC BRONCHITIS WITH ACUTE EXACERBATION, UNSPECIFIED ASTHMA SEVERITY, UNSPECIFIED WHETHER PERSISTENT: Primary | ICD-10-CM

## 2019-07-24 ENCOUNTER — TRANSCRIBE ORDERS (OUTPATIENT)
Dept: ADMINISTRATIVE | Facility: HOSPITAL | Age: 29
End: 2019-07-24

## 2019-07-24 ENCOUNTER — HOSPITAL ENCOUNTER (OUTPATIENT)
Dept: PULMONOLOGY | Facility: HOSPITAL | Age: 29
Discharge: HOME | End: 2019-07-24
Payer: COMMERCIAL

## 2019-07-24 DIAGNOSIS — J45.901 ASTHMATIC BRONCHITIS WITH ACUTE EXACERBATION, UNSPECIFIED ASTHMA SEVERITY, UNSPECIFIED WHETHER PERSISTENT: ICD-10-CM

## 2019-07-24 DIAGNOSIS — J45.901 ASTHMATIC BRONCHITIS WITH ACUTE EXACERBATION, UNSPECIFIED ASTHMA SEVERITY, UNSPECIFIED WHETHER PERSISTENT: Primary | ICD-10-CM

## 2019-07-24 PROCEDURE — 94760 N-INVAS EAR/PLS OXIMETRY 1: CPT

## 2019-07-24 PROCEDURE — 94060 EVALUATION OF WHEEZING: CPT

## 2019-07-24 PROCEDURE — 94060 EVALUATION OF WHEEZING: CPT | Performed by: INTERNAL MEDICINE

## 2019-07-24 RX ORDER — ALBUTEROL SULFATE 2.5 MG/3ML
2.5 SOLUTION RESPIRATORY (INHALATION) ONCE
Status: COMPLETED | OUTPATIENT
Start: 2019-07-24 | End: 2019-07-24

## 2019-07-24 RX ADMIN — ALBUTEROL SULFATE 2.5 MG: 2.5 SOLUTION RESPIRATORY (INHALATION) at 14:38

## 2019-07-24 RX ADMIN — ALBUTEROL SULFATE 2.5 MG: 2.5 SOLUTION RESPIRATORY (INHALATION) at 11:20

## 2023-05-15 ENCOUNTER — OFFICE VISIT (OUTPATIENT)
Dept: URGENT CARE | Facility: CLINIC | Age: 33
End: 2023-05-15

## 2023-05-15 VITALS
SYSTOLIC BLOOD PRESSURE: 135 MMHG | TEMPERATURE: 96.9 F | BODY MASS INDEX: 24.68 KG/M2 | HEART RATE: 57 BPM | OXYGEN SATURATION: 100 % | WEIGHT: 172 LBS | DIASTOLIC BLOOD PRESSURE: 76 MMHG | RESPIRATION RATE: 18 BRPM

## 2023-05-15 DIAGNOSIS — K04.7 DENTAL INFECTION: Primary | ICD-10-CM

## 2023-05-15 RX ORDER — ALBUTEROL SULFATE 90 UG/1
AEROSOL, METERED RESPIRATORY (INHALATION)
COMMUNITY
Start: 2022-08-08

## 2023-05-15 RX ORDER — DOXYCYCLINE HYCLATE 100 MG/1
100 CAPSULE ORAL EVERY 12 HOURS SCHEDULED
Qty: 14 CAPSULE | Refills: 0 | Status: SHIPPED | OUTPATIENT
Start: 2023-05-15 | End: 2023-05-22

## 2023-05-15 RX ORDER — FERROUS SULFATE 325(65) MG
1 TABLET ORAL
COMMUNITY

## 2023-05-15 RX ORDER — IBUPROFEN 800 MG/1
800 TABLET ORAL EVERY 6 HOURS PRN
Qty: 15 TABLET | Refills: 0 | Status: SHIPPED | OUTPATIENT
Start: 2023-05-15

## 2023-05-15 RX ORDER — ALBUTEROL SULFATE 1.25 MG/3ML
SOLUTION RESPIRATORY (INHALATION)
COMMUNITY
Start: 2022-09-20 | End: 2023-09-20

## 2023-05-15 NOTE — PROGRESS NOTES
[email or web address]        NAME: Saskia Torres is a 28 y o  female  : 1990    MRN: [id number]  DATE: May 15, 2023  TIME: 12:05 PM    Assessment and Plan   Dental infection [K04 7]  1  Dental infection  doxycycline hyclate (VIBRAMYCIN) 100 mg capsule    ibuprofen (MOTRIN) 800 mg tablet            Patient Instructions       Follow up with PCP in 3-5 days  Proceed to  ER if symptoms worsen  Chief Complaint     Chief Complaint   Patient presents with   • Earache     Pt  States yesterday she developed a swollen, painful gland on the right side  Pain is radiating to the right ear/ tooth area  History of Present Illness       Patient is a 27 yo female who presents for evaluation of lower right sided tooth pain and a swollen gland on the right side of her jaw since yesterday  Reports history of poor dentition  Denies fevers, dysphagia, sore throat, SOB, drooling, voice changes  Review of Systems   Review of Systems   Constitutional: Negative for fever  HENT: Positive for dental problem  Negative for drooling and trouble swallowing  Respiratory: Negative for shortness of breath  Hematological: Positive for adenopathy           Current Medications       Current Outpatient Medications:   •  albuterol (ACCUNEB) 1 25 MG/3ML nebulizer solution, INHALE CONTENTS OF ONE VIAL VIA NEBULIZER EVERY 4 HOURS AS NEEDED FOR WHEEZING, Disp: , Rfl:   •  albuterol (PROVENTIL HFA,VENTOLIN HFA) 90 mcg/act inhaler, inhale 2 puffs by mouth and INTO THE LUNGS four times a day, Disp: , Rfl:   •  doxycycline hyclate (VIBRAMYCIN) 100 mg capsule, Take 1 capsule (100 mg total) by mouth every 12 (twelve) hours for 7 days, Disp: 14 capsule, Rfl: 0  •  ferrous sulfate 325 (65 Fe) mg tablet, Take 1 tablet by mouth daily with breakfast, Disp: , Rfl:   •  fluticasone-salmeterol (Advair) 250-50 mcg/dose inhaler, Inhale 1 puff 2 (two) times a day, Disp: , Rfl:   •  ibuprofen (MOTRIN) 800 mg tablet, Take 1 tablet (800 mg total) by mouth every 6 (six) hours as needed for mild pain, Disp: 15 tablet, Rfl: 0    Current Allergies     Allergies as of 05/15/2023 - Reviewed 05/15/2023   Allergen Reaction Noted   • Cephalexin Hives 07/08/2017   • Clindamycin Hives 07/08/2017   • Penicillins Hives 07/08/2017   • Zithromax [azithromycin] Hives 07/08/2017   • Vicodin [hydrocodone-acetaminophen] Itching 07/08/2017            The following portions of the patient's history were reviewed and updated as appropriate: allergies, current medications, past family history, past medical history, past social history, past surgical history and problem list      Past Medical History:   Diagnosis Date   • Anemia        History reviewed  No pertinent surgical history  History reviewed  No pertinent family history  Medications have been verified  Objective   /76   Pulse 57   Temp (!) 96 9 °F (36 1 °C)   Resp 18   Wt 78 kg (172 lb)   SpO2 100%   BMI 24 68 kg/m²        Physical Exam     Physical Exam  Constitutional:       General: She is not in acute distress  Appearance: She is not toxic-appearing  HENT:      Mouth/Throat:      Comments: Moderate gingival edema and erythema of right lower posterior molar  Neck:      Comments: Mildly tender 0 5 cm submandibular LN  Cardiovascular:      Rate and Rhythm: Normal rate  Pulmonary:      Effort: Pulmonary effort is normal    Skin:     General: Skin is warm and dry  Neurological:      Mental Status: She is alert     Psychiatric:         Mood and Affect: Mood normal          Behavior: Behavior normal